# Patient Record
Sex: MALE | Employment: FULL TIME | URBAN - METROPOLITAN AREA
[De-identification: names, ages, dates, MRNs, and addresses within clinical notes are randomized per-mention and may not be internally consistent; named-entity substitution may affect disease eponyms.]

---

## 2017-09-11 ENCOUNTER — OFFICE VISIT (OUTPATIENT)
Dept: FAMILY MEDICINE | Facility: CLINIC | Age: 47
End: 2017-09-11
Payer: COMMERCIAL

## 2017-09-11 VITALS
HEIGHT: 72 IN | TEMPERATURE: 98.5 F | BODY MASS INDEX: 28.71 KG/M2 | DIASTOLIC BLOOD PRESSURE: 64 MMHG | SYSTOLIC BLOOD PRESSURE: 116 MMHG | OXYGEN SATURATION: 100 % | WEIGHT: 212 LBS | HEART RATE: 68 BPM

## 2017-09-11 DIAGNOSIS — J02.9 VIRAL PHARYNGITIS: Primary | ICD-10-CM

## 2017-09-11 LAB
DEPRECATED S PYO AG THROAT QL EIA: NORMAL
SPECIMEN SOURCE: NORMAL

## 2017-09-11 PROCEDURE — 99213 OFFICE O/P EST LOW 20 MIN: CPT | Performed by: FAMILY MEDICINE

## 2017-09-11 PROCEDURE — 87081 CULTURE SCREEN ONLY: CPT | Performed by: FAMILY MEDICINE

## 2017-09-11 PROCEDURE — 87880 STREP A ASSAY W/OPTIC: CPT | Performed by: FAMILY MEDICINE

## 2017-09-11 NOTE — PROGRESS NOTES
"  SUBJECTIVE:   Jeovany Wilburn is a 47 year old male who presents to clinic today for the following health issues:      Acute Illness   Acute illness concerns: sore throat  Onset: 2 weeks    Fever: no    Chills/Sweats: no    Headache (location?): YES    Sinus Pressure:YES    Conjunctivitis:  no    Ear Pain: no    Rhinorrhea: YES    Congestion: YES    Sore Throat: YES     Cough: YES-non-productive    Wheeze: YES    Decreased Appetite: YES- Less PO    Nausea: no    Vomiting: no    Diarrhea:  no    Dysuria/Freq.: no    Fatigue/Achiness: YES- fatigue    Sick/Strep Exposure: no     Therapies Tried and outcome: None    - Patient has heaviness in his chest. Started as restriction in throat that moved down. He has no energy for the past 2 weeks. Patient normally works out often, has not been able to for the past 2 weeks. He thinks he is getting better now.       ROS:  Constitutional, HEENT, cardiovascular, pulmonary, gi and gu systems are negative, except as otherwise noted.    This document serves as a record of the services and decisions personally performed and made by Danyell Shah MD. It was created on his behalf by Chava Bro, a trained medical scribe. The creation of this document is based the provider's statements to the medical scribe.  Chava Bro 2:47 PM September 11, 2017  OBJECTIVE:   /64  Pulse 68  Temp 98.5  F (36.9  C) (Tympanic)  Ht 5' 11.5\" (1.816 m)  Wt 212 lb (96.2 kg)  SpO2 100%  BMI 29.16 kg/m2  Body mass index is 29.16 kg/(m^2).       GENERAL: healthy, alert and no distress  EYES: Eyes grossly normal to inspection, conjunctivae and sclerae normal  HENT: ear canals and TM's normal, nose and mouth without ulcers or lesions  RESP: lungs clear to auscultation - no rales, rhonchi or wheezes  CV: regular rate and rhythm, normal S1 S2, no murmur  ABDOMEN: soft, nontender, no hepatosplenomegaly, no masses and bowel sounds normal  MS: no gross musculoskeletal defects noted, no " edema        ASSESSMENT/PLAN:     (J02.9) Viral pharyngitis  (primary encounter diagnosis)  Comment: Rapid was negative. Patient should continue to improve with time.   Plan: Strep, Rapid Screen            Patient will follow up if symptoms worsen or do not improve. Patient instructed to call with any questions or concerns.      Patient Instructions   *   No signs of strep throat.     *   Sounds like a viral bronchitis.     *   Since you've         The information in this document, created by a scribe for me, accurately reflects the services I personally performed and the decisions made by me. I have reviewed and approved this document for accuracy. 2:48 PM 9/11/2017      Danyell Shah MD  [facility]

## 2017-09-11 NOTE — NURSING NOTE
"Chief Complaint   Patient presents with     URI       Initial /64  Pulse 68  Temp 98.5  F (36.9  C) (Tympanic)  Ht 5' 11.5\" (1.816 m)  Wt 212 lb (96.2 kg)  SpO2 100%  BMI 29.16 kg/m2 Estimated body mass index is 29.16 kg/(m^2) as calculated from the following:    Height as of this encounter: 5' 11.5\" (1.816 m).    Weight as of this encounter: 212 lb (96.2 kg).  Medication Reconciliation: complete  "

## 2017-09-11 NOTE — MR AVS SNAPSHOT
"              After Visit Summary   9/11/2017    Jeovany Wilburn    MRN: [id number]           Patient Information     Date Of Birth          1970        Visit Information        Provider Department      9/11/2017 2:20 PM Danyell Shah MD [facility]        Today's Diagnoses     Viral pharyngitis    -  1      Care Instructions    *   No signs of strep throat.     *   Sounds like a viral bronchitis.     *   Since you've           Follow-ups after your visit        Who to contact     Normal or non-critical lab and imaging results will be communicated to you by Corpsolvhart, letter or phone within 4 business days after the clinic has received the results. If you do not hear from us within 7 days, please contact the clinic through Motigat or phone. If you have a critical or abnormal lab result, we will notify you by phone as soon as possible.  Submit refill requests through Zaggora or call your pharmacy and they will forward the refill request to us. Please allow 3 business days for your refill to be completed.          If you need to speak with a  for additional information , please call: [phone]           Additional Information About Your Visit        [email or web address] Information     Zaggora gives you secure access to your electronic health record. If you see a primary care provider, you can also send messages to your care team and make appointments. If you have questions, please call your primary care clinic.  If you do not have a primary care provider, please call [phone] and they will assist you.        Care EveryWhere ID     This is your Care EveryWhere ID. This could be used by other organizations to access your [place] medical records  [id number]        Your Vitals Were     Pulse Temperature Height Pulse Oximetry BMI (Body Mass Index)       68 98.5  F (36.9  C) (Tympanic) 5' 11.5\" (1.816 m) 100% 29.16 kg/m2        Blood Pressure from Last 3 Encounters:   09/11/17 116/64 "   12/12/14 115/76   10/24/14 122/79    Weight from Last 3 Encounters:   09/11/17 212 lb (96.2 kg)   12/12/14 223 lb 6.4 oz (101.3 kg)   10/03/14 206 lb 3.2 oz (93.5 kg)              We Performed the Following     Beta strep group A culture     Strep, Rapid Screen        Primary Care Provider Office Phone # Fax #    Danyell Shah [phone] [address] [place] [address]        Equal Access to Services     [facility]: Hadii aad ku hadasho Soomaali, waaxda luqadaha, qaybta kaalmada adeegyada, waxay mashain hayotilio lai . So [facility] [phone].    ATENCIÓN: Si habla español, tiene a fernandez disposición servicios gratuitos de asistencia lingüística. [facility] [phone].    We comply with applicable federal civil rights laws and [place] laws. We do not discriminate on the basis of race, color, national origin, age, disability sex, sexual orientation or gender identity.            Thank you!     Thank you for choosing [facility]  for your care. Our goal is always to provide you with excellent care. Hearing back from our patients is one way we can continue to improve our services. Please take a few minutes to complete the written survey that you may receive in the mail after your visit with us. Thank you!             Your Updated Medication List - Protect others around you: Learn how to safely use, store and throw away your medicines at www.disposemymeds.org.          This list is accurate as of: 9/11/17 11:59 PM.  Always use your most recent med list.                   Brand Name Dispense Instructions for use Diagnosis    fluticasone 50 MCG/ACT spray    FLONASE    1 Package    Spray 1-2 sprays into both nostrils daily    Chronic rhinitis

## 2017-09-12 LAB
BACTERIA SPEC CULT: NORMAL
SPECIMEN SOURCE: NORMAL

## 2017-10-19 ENCOUNTER — OFFICE VISIT (OUTPATIENT)
Dept: FAMILY MEDICINE | Facility: CLINIC | Age: 47
End: 2017-10-19
Payer: COMMERCIAL

## 2017-10-19 VITALS
DIASTOLIC BLOOD PRESSURE: 72 MMHG | HEART RATE: 63 BPM | SYSTOLIC BLOOD PRESSURE: 114 MMHG | WEIGHT: 224.8 LBS | BODY MASS INDEX: 30.92 KG/M2 | OXYGEN SATURATION: 98 % | TEMPERATURE: 97.6 F

## 2017-10-19 DIAGNOSIS — J01.01 ACUTE RECURRENT MAXILLARY SINUSITIS: Primary | ICD-10-CM

## 2017-10-19 PROCEDURE — 99213 OFFICE O/P EST LOW 20 MIN: CPT | Performed by: NURSE PRACTITIONER

## 2017-10-19 RX ORDER — AMOXICILLIN 500 MG/1
1000 CAPSULE ORAL 2 TIMES DAILY
Qty: 40 CAPSULE | Refills: 0 | Status: SHIPPED | OUTPATIENT
Start: 2017-10-19 | End: 2017-10-29

## 2017-10-19 NOTE — MR AVS SNAPSHOT
After Visit Summary   10/19/2017    Jeovany Wilburn    MRN: [id number]           Patient Information     Date Of Birth          1970        Visit Information        Provider Department      10/19/2017 7:20 AM Rivka Pascual APRN [facility]        Today's Diagnoses     Acute recurrent maxillary sinusitis    -  1      Care Instructions    Start the Amoxicillin today   Today and tomorrow you can take the medication 3 times then on Saturday drop to the twice per day dosing .     Airborne is good to take with traveling     Stay well hydrated - urine should be clear.      Get airplane ear plugs to wear when flying to prevent the ear pain             Follow-ups after your visit        Who to contact     Normal or non-critical lab and imaging results will be communicated to you by NoWaithart, letter or phone within 4 business days after the clinic has received the results. If you do not hear from us within 7 days, please contact the clinic through NoWaithart or phone. If you have a critical or abnormal lab result, we will notify you by phone as soon as possible.  Submit refill requests through Intale or call your pharmacy and they will forward the refill request to us. Please allow 3 business days for your refill to be completed.          If you need to speak with a  for additional information , please call: [phone]           Additional Information About Your Visit        Intale Information     Intale gives you secure access to your electronic health record. If you see a primary care provider, you can also send messages to your care team and make appointments. If you have questions, please call your primary care clinic.  If you do not have a primary care provider, please call [phone] and they will assist you.        Care EveryWhere ID     This is your Care EveryWhere ID. This could be used by other organizations to access your [facility]  records  [id number]        Your Vitals Were     Pulse Temperature Pulse Oximetry BMI (Body Mass Index)          63 97.6  F (36.4  C) (Tympanic) 98% 30.92 kg/m2         Blood Pressure from Last 3 Encounters:   10/19/17 114/72   09/11/17 116/64   12/12/14 115/76    Weight from Last 3 Encounters:   10/19/17 224 lb 12.8 oz (102 kg)   09/11/17 212 lb (96.2 kg)   12/12/14 223 lb 6.4 oz (101.3 kg)              Today, you had the following     No orders found for display         Today's Medication Changes          These changes are accurate as of: 10/19/17  7:43 AM.  If you have any questions, ask your nurse or doctor.               Start taking these medicines.        Dose/Directions    amoxicillin 500 MG capsule   Commonly known as:  AMOXIL   Used for:  Acute recurrent maxillary sinusitis   Started by:  Rivka Pascual, ESTEPHANIA CNP        Dose:  1000 mg   Take 2 capsules (1,000 mg) by mouth 2 times daily for 10 days   Quantity:  40 capsule   Refills:  0            Where to get your medicines      These medications [facility] [address] [place] [address] [facility]  [address] [address]     Phone:  [phone]     amoxicillin 500 MG capsule                Primary Care Provider Office Phone # Fax #    Danyell Shah [phone] [address]   [place] [address]        Equal Access to Services     [facility]: Hadii xiomy darbyo Soomaali, waaxda luqadaha, qaybta kaalmada adeegyada, waxjosh sammie wright adecarmen alexandre. So [facility] [place] [phone].    ATENCIÓN: Si habla español, tiene a fernandez disposición servicios gratuitos de asistencia lingüística. Llame al [phone].    We comply with applicable federal civil rights laws and [place] laws. We do not discriminate on the basis of race, color, national origin, age, disability, sex, sexual orientation, or gender identity.            Thank you!     Thank you for choosing [facility]  for  your care. Our goal is always to provide you with excellent care. Hearing back from our patients is one way we can continue to improve our services. Please take a few minutes to complete the written survey that you may receive in the mail after your visit with us. Thank you!             Your Updated Medication List - Protect others around you: Learn how to safely use, store and throw away your medicines at www.disposemymeds.org.          This list is accurate as of: 10/19/17  7:43 AM.  Always use your most recent med list.                   Brand Name Dispense Instructions for use Diagnosis    amoxicillin 500 MG capsule    AMOXIL    40 capsule    Take 2 capsules (1,000 mg) by mouth 2 times daily for 10 days    Acute recurrent maxillary sinusitis       fluticasone 50 MCG/ACT spray    FLONASE    1 Package    Spray 1-2 sprays into both nostrils daily    Chronic rhinitis

## 2017-10-19 NOTE — NURSING NOTE
"Chief Complaint   Patient presents with     Pharyngitis     Cough     Fatigue       Initial /72 (BP Location: Left arm, Patient Position: Chair, Cuff Size: Adult Large)  Pulse 63  Temp 97.6  F (36.4  C) (Tympanic)  Wt 224 lb 12.8 oz (102 kg)  SpO2 98%  BMI 30.92 kg/m2 Estimated body mass index is 30.92 kg/(m^2) as calculated from the following:    Height as of 9/11/17: 5' 11.5\" (1.816 m).    Weight as of this encounter: 224 lb 12.8 oz (102 kg).  Medication Reconciliation: complete     Tanisha Perdomo CMA (AAMA)      "

## 2017-10-19 NOTE — PROGRESS NOTES
"  SUBJECTIVE:   Jeovany Wilburn is a 47 year old male who presents to clinic today for the following health issues:    *HM - Flu shot not offered due to symptoms. Lipids deferred to visit with PCP.    ENT Symptoms             Symptoms: cc Present Absent Comment   Fever/Chills   x    Fatigue x x  More tired than usual   Muscle Aches   x    Eye Irritation  x  Burning    Sneezing   x    Nasal Lester/Drg  x  Congestion   Sinus Pressure/Pain  x  Sinus headaches   Loss of smell   x    Dental pain  x     Sore Throat x x  \"at the back of the tongue\", constant PND   Swollen Glands   x    Ear Pain/Fullness  x  Bilateral plugged feeling   Cough x x  Dry cough   Wheeze   x    Chest Pain   x    Shortness of breath   x    Rash   x    Other  x  Migraines     Symptom duration:  4 weeks   Symptom severity:  Not getting better, moderate   Treatments tried:  Flonase   Contacts:  2 people at work have had walking pneumonia and he had been traveling together for a week           Was seen 2 weeks ago with similar symptoms , is getting worse.    Exposed to pneumonia     Problem list and histories reviewed & adjusted, as indicated.  Additional history: as documented    Patient Active Problem List   Diagnosis     Atopic rhinitis     ETD (eustachian tube dysfunction)     24 hour contact given to patient      CARDIOVASCULAR SCREENING; LDL GOAL LESS THAN 160     Gout     Past Surgical History:   Procedure Laterality Date     LAMINECT/DISCECTOMY, LUMBAR  1/1/2004    Laminectomy/Discectomy Cervical - fusion       Social History   Substance Use Topics     Smoking status: Never Smoker     Smokeless tobacco: Never Used     Alcohol use Yes      Comment: 3-4 drinks per week     Family History   Problem Relation Age of Onset     Family History Negative Mother      Family History Negative Father          Current Outpatient Prescriptions   Medication Sig Dispense Refill     fluticasone (FLONASE) 50 MCG/ACT nasal spray Spray 1-2 sprays into both nostrils " daily 1 Package 11     No Known Allergies  BP Readings from Last 3 Encounters:   10/19/17 114/72   09/11/17 116/64   12/12/14 115/76    Wt Readings from Last 3 Encounters:   10/19/17 224 lb 12.8 oz (102 kg)   09/11/17 212 lb (96.2 kg)   12/12/14 223 lb 6.4 oz (101.3 kg)                        Reviewed and updated as needed this visit by clinical staff     Reviewed and updated as needed this visit by Provider         ROS:  See notes above for  HPI     OBJECTIVE:     /72 (BP Location: Left arm, Patient Position: Chair, Cuff Size: Adult Large)  Pulse 63  Temp 97.6  F (36.4  C) (Tympanic)  Wt 224 lb 12.8 oz (102 kg)  SpO2 98%  BMI 30.92 kg/m2  Body mass index is 30.92 kg/(m^2).   GENERAL: healthy, alert and no distress  HENT: normal cephalic/atraumatic, right ear: clear effusion, left ear: clear effusion, nose and mouth without ulcers or lesions, nasal mucosa edematous, oropharynx clear, oral mucous membranes moist and positive for tonsillar hypertrophy. Maxillary sinuses slightly tender bilaterally.  NECK: Positive for bilateral anterior cervical adenopathy  RESP: lungs clear to auscultation - no rales, rhonchi or wheezes  CV: regular rate and rhythm, normal S1 S2, no S3 or S4, no murmur, click or rub, no peripheral edema and peripheral pulses strong    Diagnostic Test Results:  none     ASSESSMENT/PLAN:     ASSESSMENT/PLAN:      ICD-10-CM    1. Acute recurrent maxillary sinusitis J01.01 amoxicillin (AMOXIL) 500 MG capsule       Patient Instructions   Start the Amoxicillin today   Today and tomorrow you can take the medication 3 times then on Saturday drop to the twice per day dosing .     Airborne is good to take with traveling     Stay well hydrated - urine should be clear.      Get airplane ear plugs to wear when flying to prevent the ear pain                    MEDICATIONS:        - Start taking Amoxicillin        - Continue other medications without change  See Patient Instructions    VINCENT RAGLAND NP,  [facility] [facility]

## 2017-10-19 NOTE — PATIENT INSTRUCTIONS
Start the Amoxicillin today   Today and tomorrow you can take the medication 3 times then on Saturday drop to the twice per day dosing .     Airborne is good to take with traveling     Stay well hydrated - urine should be clear.      Get airplane ear plugs to wear when flying to prevent the ear pain

## 2018-04-25 ENCOUNTER — APPOINTMENT (RX ONLY)
Dept: URBAN - METROPOLITAN AREA CLINIC 23 | Facility: CLINIC | Age: 48
Setting detail: DERMATOLOGY
End: 2018-04-25

## 2018-04-25 DIAGNOSIS — I87.2 VENOUS INSUFFICIENCY (CHRONIC) (PERIPHERAL): ICD-10-CM

## 2018-04-25 DIAGNOSIS — D22 MELANOCYTIC NEVI: ICD-10-CM

## 2018-04-25 DIAGNOSIS — L30.9 DERMATITIS, UNSPECIFIED: ICD-10-CM

## 2018-04-25 DIAGNOSIS — L82.1 OTHER SEBORRHEIC KERATOSIS: ICD-10-CM

## 2018-04-25 PROBLEM — I83.12 VARICOSE VEINS OF LEFT LOWER EXTREMITY WITH INFLAMMATION: Status: ACTIVE | Noted: 2018-04-25

## 2018-04-25 PROBLEM — D22.39 MELANOCYTIC NEVI OF OTHER PARTS OF FACE: Status: ACTIVE | Noted: 2018-04-25

## 2018-04-25 PROBLEM — I83.11 VARICOSE VEINS OF RIGHT LOWER EXTREMITY WITH INFLAMMATION: Status: ACTIVE | Noted: 2018-04-25

## 2018-04-25 PROCEDURE — ? OTHER

## 2018-04-25 PROCEDURE — ? OBSERVATION

## 2018-04-25 PROCEDURE — ? BODY PHOTOGRAPHY

## 2018-04-25 PROCEDURE — ? PRESCRIPTION

## 2018-04-25 PROCEDURE — ? BIOPSY BY SHAVE METHOD

## 2018-04-25 PROCEDURE — 11100: CPT

## 2018-04-25 PROCEDURE — ? RECOMMENDATIONS

## 2018-04-25 PROCEDURE — ? COUNSELING

## 2018-04-25 PROCEDURE — 99213 OFFICE O/P EST LOW 20 MIN: CPT | Mod: 25

## 2018-04-25 RX ORDER — TRIAMCINOLONE ACETONIDE 1 MG/G
CREAM TOPICAL
Qty: 1 | Refills: 3 | Status: ERX | COMMUNITY
Start: 2018-04-25

## 2018-04-25 RX ORDER — CLOBETASOL PROPIONATE 0.5 MG/G
OINTMENT TOPICAL
Qty: 1 | Refills: 2 | Status: ERX | COMMUNITY
Start: 2018-04-25

## 2018-04-25 RX ADMIN — CLOBETASOL PROPIONATE: 0.5 OINTMENT TOPICAL at 14:48

## 2018-04-25 RX ADMIN — TRIAMCINOLONE ACETONIDE: 1 CREAM TOPICAL at 14:45

## 2018-04-25 ASSESSMENT — LOCATION SIMPLE DESCRIPTION DERM
LOCATION SIMPLE: NOSE
LOCATION SIMPLE: RIGHT PRETIBIAL REGION
LOCATION SIMPLE: LEFT FOREARM
LOCATION SIMPLE: ABDOMEN
LOCATION SIMPLE: LEFT TEMPLE
LOCATION SIMPLE: RIGHT LOWER BACK
LOCATION SIMPLE: RIGHT UPPER ARM
LOCATION SIMPLE: CHEST
LOCATION SIMPLE: RIGHT THIGH
LOCATION SIMPLE: LEFT BUTTOCK
LOCATION SIMPLE: RIGHT BUTTOCK
LOCATION SIMPLE: LEFT UPPER ARM
LOCATION SIMPLE: RIGHT FOREARM
LOCATION SIMPLE: LEFT NOSE
LOCATION SIMPLE: LEFT PRETIBIAL REGION

## 2018-04-25 ASSESSMENT — LOCATION DETAILED DESCRIPTION DERM
LOCATION DETAILED: LEFT PROXIMAL POSTERIOR UPPER ARM
LOCATION DETAILED: RIGHT PROXIMAL PRETIBIAL REGION
LOCATION DETAILED: XIPHOID
LOCATION DETAILED: LEFT MEDIAL INFERIOR CHEST
LOCATION DETAILED: LEFT PROXIMAL DORSAL FOREARM
LOCATION DETAILED: LEFT NASAL DORSUM
LOCATION DETAILED: RIGHT ANTERIOR PROXIMAL THIGH
LOCATION DETAILED: LEFT LATERAL TEMPLE
LOCATION DETAILED: LEFT DISTAL PRETIBIAL REGION
LOCATION DETAILED: RIGHT DISTAL PRETIBIAL REGION
LOCATION DETAILED: LEFT BUTTOCK
LOCATION DETAILED: LEFT NASAL ALA
LOCATION DETAILED: RIGHT INFERIOR MEDIAL MIDBACK
LOCATION DETAILED: RIGHT BUTTOCK
LOCATION DETAILED: RIGHT PROXIMAL POSTERIOR UPPER ARM
LOCATION DETAILED: RIGHT PROXIMAL RADIAL DORSAL FOREARM
LOCATION DETAILED: STERNUM
LOCATION DETAILED: EPIGASTRIC SKIN
LOCATION DETAILED: LEFT PROXIMAL PRETIBIAL REGION

## 2018-04-25 ASSESSMENT — LOCATION ZONE DERM
LOCATION ZONE: TRUNK
LOCATION ZONE: ARM
LOCATION ZONE: NOSE
LOCATION ZONE: FACE
LOCATION ZONE: LEG

## 2018-04-25 NOTE — PROCEDURE: BODY PHOTOGRAPHY
Number Of Photographs (Optional- Will Not Render If 0): 1
Was The Entire Body Photographed (Cannot Bill Unless Entire Body Photographed)?: No
Consent: Written consent obtained, risks reviewed for whole body photography. Patient understands that photograph costs may not be covered by insurance, and patient is ultimately responsible for payment.
Whole Body Statement: The whole body was photographed today.
Detail Level: Detailed

## 2018-04-25 NOTE — PROCEDURE: OTHER
Note Text (......Xxx Chief Complaint.): This diagnosis correlates with the
Detail Level: Simple
Other (Free Text): Been present whole life per pt.

## 2018-04-25 NOTE — HPI: DISCOLORATION
How Severe Is Your Skin Discoloration?: moderate
Additional History: The patient states that the area retains fluid and is tender if touched.

## 2018-04-25 NOTE — PROCEDURE: BIOPSY BY SHAVE METHOD
Biopsy Type: H and E
Billing Type: Third-Party Bill
Accession #: PC
Dressing: bandage
Biopsy Method: double edge Personna blade
Bill 53151 For Specimen Handling/Conveyance To Laboratory?: no
Wound Care: Vaseline
Anesthesia Volume In Cc: 0.5
Consent: Written consent was obtained and risks were reviewed including but not limited to scarring, infection, bleeding, scabbing, incomplete removal, and allergy to anesthesia.
Detail Level: Detailed
Post-care instructions were reviewed in detail and written instructions are provided. Patient is to keep the biopsy site dry overnight, and then apply bacitracin twice daily until healed. Patient may apply hydrogen peroxide soaks to remove any crusting.
Was A Bandage Applied: Yes
Electrodesiccation Text: The wound bed was treated with electrodesiccation after the biopsy was performed.
X Size Of Lesion In Cm: 0
Silver Nitrate Text: The wound bed was treated with silver nitrate after the biopsy was performed.
Anesthesia Type: 1% lidocaine without epinephrine and a 1:10 solution of 8.4% sodium bicarbonate
Electrodesiccation And Curettage Text: The wound bed was treated with electrodesiccation and curettage after the biopsy was performed.
Cryotherapy Text: The wound bed was treated with cryotherapy after the biopsy was performed.
Notification Instructions: Patient will be notified of biopsy results. However, patient instructed to call the office if not contacted within 2 weeks.
Type Of Destruction Used: Curettage
Hemostasis: Aluminum Chloride

## 2018-04-25 NOTE — PROCEDURE: RECOMMENDATIONS
Detail Level: Zone
Recommendations (Free Text): Wear compression stockings. Elevate legs. He is a . PCP ruled out a clot recently with US

## 2018-10-26 ENCOUNTER — OFFICE VISIT (OUTPATIENT)
Dept: FAMILY MEDICINE | Facility: CLINIC | Age: 48
End: 2018-10-26
Payer: COMMERCIAL

## 2018-10-26 VITALS
HEIGHT: 71 IN | DIASTOLIC BLOOD PRESSURE: 68 MMHG | RESPIRATION RATE: 20 BRPM | TEMPERATURE: 97.1 F | BODY MASS INDEX: 28.92 KG/M2 | SYSTOLIC BLOOD PRESSURE: 100 MMHG | WEIGHT: 206.6 LBS | HEART RATE: 72 BPM

## 2018-10-26 DIAGNOSIS — M54.2 CERVICAL PAIN (NECK): ICD-10-CM

## 2018-10-26 DIAGNOSIS — M54.10 RADICULOPATHY AFFECTING UPPER EXTREMITY: ICD-10-CM

## 2018-10-26 DIAGNOSIS — Z12.11 ENCOUNTER FOR FIT (FECAL IMMUNOCHEMICAL TEST) SCREENING: Primary | ICD-10-CM

## 2018-10-26 DIAGNOSIS — Z13.6 SCREENING FOR CARDIOVASCULAR CONDITION: ICD-10-CM

## 2018-10-26 DIAGNOSIS — Z23 NEED FOR PROPHYLACTIC VACCINATION AND INOCULATION AGAINST INFLUENZA: ICD-10-CM

## 2018-10-26 DIAGNOSIS — Z13.1 SCREENING FOR DIABETES MELLITUS: ICD-10-CM

## 2018-10-26 PROCEDURE — 90471 IMMUNIZATION ADMIN: CPT | Performed by: NURSE PRACTITIONER

## 2018-10-26 PROCEDURE — 99214 OFFICE O/P EST MOD 30 MIN: CPT | Mod: 25 | Performed by: NURSE PRACTITIONER

## 2018-10-26 PROCEDURE — 90682 RIV4 VACC RECOMBINANT DNA IM: CPT | Performed by: NURSE PRACTITIONER

## 2018-10-26 RX ORDER — DIAZEPAM 5 MG
TABLET ORAL
Qty: 2 TABLET | Refills: 0 | Status: SHIPPED | OUTPATIENT
Start: 2018-10-26 | End: 2018-11-07

## 2018-10-26 RX ORDER — HYDROCODONE BITARTRATE AND ACETAMINOPHEN 5; 325 MG/1; MG/1
1 TABLET ORAL EVERY 4 HOURS PRN
Qty: 20 TABLET | Refills: 0 | Status: SHIPPED | OUTPATIENT
Start: 2018-10-26 | End: 2018-11-07

## 2018-10-26 ASSESSMENT — PAIN SCALES - GENERAL: PAINLEVEL: MODERATE PAIN (4)

## 2018-10-26 ASSESSMENT — ENCOUNTER SYMPTOMS
SORE THROAT: 0
FATIGUE: 0
CONSTIPATION: 0
COUGH: 0
DYSURIA: 0
NECK PAIN: 1
HEADACHES: 0
NUMBNESS: 1
ARTHRALGIAS: 0
ABDOMINAL PAIN: 0
DYSPHORIC MOOD: 0
JOINT SWELLING: 0
SHORTNESS OF BREATH: 0
WHEEZING: 0
SINUS PRESSURE: 0
RHINORRHEA: 0
PALPITATIONS: 0
FREQUENCY: 0
SLEEP DISTURBANCE: 0
NERVOUS/ANXIOUS: 0
NECK STIFFNESS: 1
BACK PAIN: 0
DIARRHEA: 0
LIGHT-HEADEDNESS: 0
DIZZINESS: 0

## 2018-10-26 NOTE — PROGRESS NOTES
SUBJECTIVE:   Jeovany Wilburn is a 48 year old male who presents to clinic today for the following health issues:      Back Pain       Duration: 15 years of back pain, over past 3 weeks pain has been worsening        Specific cause: no known injury but woke up in hotel with stiff neck after sleeping on bad pillow    Description:   Location of pain: Numbness in left arm all the time, right arm numbness that comes and goes. Pain can be in neck and down to between shoulders. Pain doesn't go below shoulder level.   Character of pain: sharp pains in neck, sore muscle pain if pain is lower than neck  Pain radiation:tightness in upper left arm  New numbness or weakness in legs, not attributed to pain:  no     Intensity: Currently 4/10    History:   Pain interferes with job: YES, pain interferes with everything he does  History of back problems: YES-C5-C6 fusion 15 years ago, lower back surgery 4 years ago  Any previous MRI or X-rays: Yes--15 years ago had MRI (unsure of location)  Sees a specialist for back pain:  No  Therapies tried without relief: none    Alleviating factors:   Improved by: ibuprofen 600 mg three times per day, heat, massage and stretching helps temporarily    Precipitating factors:  Worsened by: turning head, lifting        Accompanying Signs & Symptoms:  Risk of Fracture:  None  Risk of Cauda Equina:  None  Risk of Infection:  None  Risk of Cancer:  None  Risk of Ankylosing Spondylitis:  Onset at age <35, male, AND morning back stiffness. no     Problem list and histories reviewed & adjusted, as indicated.  Additional history: as documented    Current Outpatient Prescriptions   Medication Sig Dispense Refill     diazepam (VALIUM) 5 MG tablet Take 30-60 minutes before procedure. May repeat in 30 minutes if needed. Do not operate a vehicle after taking this medication. 2 tablet 0     fluticasone (FLONASE) 50 MCG/ACT nasal spray Spray 1-2 sprays into both nostrils daily 1 Package 11      HYDROcodone-acetaminophen (NORCO) 5-325 MG per tablet Take 1 tablet by mouth every 4 hours as needed for pain 20 tablet 0     IBUPROFEN PO Take 600 mg by mouth       No Known Allergies    Reviewed and updated as needed this visit by clinical staff  Tobacco  Allergies  Meds  Problems  Med Hx  Surg Hx  Fam Hx  Soc Hx        Reviewed and updated as needed this visit by Provider  Problems          15 years ago with ruptured disk in neck. At that time had been having some stiffness in neck. Feels like at that time, did not keep up with PHYSICAL THERAPY. Has had lower back pain for long time. Then about 4 years ago pain really started to get bad to lower back and was having pain down leg. Had 1 vertebrae that was slipping on top of the other one. At that time thought that lower back acutally cuased the problem with neck. Aterior and posterior fusion to lower back about 4 years ago. Works out, lifts weights. Occasionally will have stiff neck. 3 weeks ago in hotel had stiff neck. Returned home and started normal routine and neck kept getting worse. Started with numbness to left arm. Does occasionally get numbness to right arm but that comes and goes. Left arm has different levels of numbness. Has been taking 600 mg of advil three times per day. Lower back is still good, no pain there.     Has never had a colonoscopy.  Is agreeable to getting one once neck is fixed.      ROS:  Review of Systems   Constitutional: Negative for fatigue.   HENT: Negative for congestion, ear pain, rhinorrhea, sinus pressure and sore throat.    Respiratory: Negative for cough, shortness of breath and wheezing.    Cardiovascular: Negative for chest pain, palpitations and leg swelling.   Gastrointestinal: Negative for abdominal pain, constipation and diarrhea.   Genitourinary: Negative for dysuria and frequency.   Musculoskeletal: Positive for neck pain and neck stiffness. Negative for arthralgias, back pain and joint swelling.   Skin:  "Negative for rash.   Neurological: Positive for numbness (right arm all the time, left arm comes and goes). Negative for dizziness, light-headedness and headaches.   Psychiatric/Behavioral: Negative for dysphoric mood and sleep disturbance. The patient is not nervous/anxious.          OBJECTIVE:     /68 (BP Location: Left arm, Patient Position: Sitting, Cuff Size: Adult Large)  Pulse 72  Temp 97.1  F (36.2  C) (Tympanic)  Resp 20  Ht 5' 10.75\" (1.797 m)  Wt 206 lb 9.6 oz (93.7 kg)  BMI 29.02 kg/m2  Body mass index is 29.02 kg/(m^2).  Physical Exam   Constitutional: He appears well-developed and well-nourished.   Cardiovascular: Normal rate, regular rhythm and normal heart sounds.    Pulmonary/Chest: Effort normal and breath sounds normal.   Musculoskeletal:        Cervical back: He exhibits decreased range of motion, tenderness, bony tenderness and pain. He exhibits no swelling, no edema and no spasm.   Neurological: He is alert.   Skin: Skin is warm and dry.       ASSESSMENT/PLAN:   1. Encounter for FIT (fecal immunochemical test) screening  Order placed, plans to call per self and set up  - Fecal colorectal cancer screen FIT; Future    2. Cervical pain (neck)  We will plan to set up for MRI and refer to neurosurgery.  Educated on use of diazepam.  Aware that will need a  for MRI.  Encouraged to refrain from heavy lifting, working out.  Continue ibuprofen.  Educated regarding warning signs to watch for and when would need to seek immediate medical attention.  Initially, declines need for pain medication.  Will give written prescription to have on hand over the weekend or for use in the future if pain increases.  Educated on use.  - MR Cervical Spine w/o Contrast; Future  - NEUROSURGERY REFERRAL  - diazepam (VALIUM) 5 MG tablet; Take 30-60 minutes before procedure. May repeat in 30 minutes if needed. Do not operate a vehicle after taking this medication.  Dispense: 2 tablet; Refill: 0  - " HYDROcodone-acetaminophen (NORCO) 5-325 MG per tablet; Take 1 tablet by mouth every 4 hours as needed for pain  Dispense: 20 tablet; Refill: 0    3. Need for prophylactic vaccination and inoculation against influenza  Administer in clinic today   - FLU VACCINE, (RIV4) RECOMBINANT HA  , IM (FluBlok, egg free) [14705]- >18 YRS ([facility] recommended  50-64 YRS)  - Vaccine Administration, Initial [16610]    4. Radiculopathy affecting upper extremity  We will plan to set up for MRI and refer to neurosurgery.  Educated on use of diazepam.  Aware that will need a  for MRI.  Encouraged to refrain from heavy lifting, working out.  Continue ibuprofen.  Educated regarding warning signs to watch for and when would need to seek immediate medical attention.  Initially, declines need for pain medication.  Will give written prescription to have on hand over the weekend or for use in the future if pain increases.  Educated on use.    5. Screening for diabetes mellitus  Return for fasting labs   - Basic metabolic panel; Future    6. Screening for cardiovascular condition  Return for fasting labs   - Lipid panel reflex to direct LDL Fasting; Future    ESTEPHANIA Thorne [facility]

## 2018-10-26 NOTE — MR AVS SNAPSHOT
After Visit Summary   10/26/2018    Jeovany Wilburn    MRN: [id number]           Patient Information     Date Of Birth          1970        Visit Information        Provider Department      10/26/2018 9:00 AM Karey Munoz APRN CNP [facility]        Today's Diagnoses     Encounter for FIT (fecal immunochemical test) screening    -  1    Cervical pain (neck)           Follow-ups after your visit        Additional Services     NEUROSURGERY REFERRAL       Your provider has referred you to: [facility]: [facility] [phone]   [email or web address]  [facility]: [facility] [place] [facility] [place] [phone]   [email or web address]  [facility]: [facility] - [place] [phone]   [email or web address]    Please be aware that coverage of these services is subject to the terms and limitations of your health insurance plan.  Call member services at your health plan with any benefit or coverage questions.      Please bring the following with you to your appointment:    (1) Any X-Rays, CTs or MRIs which have been performed.  Contact the facility where they were done to arrange for  prior to your scheduled appointment.   (2) List of current medications  (3) This referral request   (4) Any documents/labs given to you for this referral                  Follow-up notes from your care team     Return for A Fasting Lab Only Visit.      Your next 10 appointments already scheduled     Oct 31, 2018  9:00 AM CDT   LAB with [facility]   [facility] ([facility])    [address] [address] [phone]           Please do not eat 10-12 hours before your appointment if you are coming in fasting for labs on lipids, cholesterol, or glucose (sugar). This does not apply to pregnant women. Water, hot tea and black coffee  "(with nothing added) are okay. Do not drink other fluids, diet soda or chew gum.              Future tests that were ordered for you today     Open Future Orders        Priority Expected Expires Ordered    Fecal colorectal cancer screen FIT Routine 11/16/2018 1/18/2019 10/26/2018    MR Cervical Spine w/o Contrast Routine  10/26/2019 10/26/2018            Who to contact     Normal or non-critical lab and imaging results will be communicated to you by Liter[employer], letter or phone within 4 business days after the clinic has received the results. If you do not hear from us within 7 days, please contact the clinic through [employer] or phone. If you have a critical or abnormal lab result, we will notify you by phone as soon as possible.  Submit refill requests through [employer] or call your pharmacy and they will forward the refill request to us. Please allow 3 business days for your refill to be completed.          If you need to speak with a  for additional information , please call: [phone]           Additional Information About Your Visit        [employer] Information     [employer] gives you secure access to your electronic health record. If you see a primary care provider, you can also send messages to your care team and make appointments. If you have questions, please call your primary care clinic.  If you do not have a primary care provider, please call [phone] and they will assist you.        Care EveryWhere ID     This is your Care EveryWhere ID. This could be used by other organizations to access your [place] medical records  [id number]        Your Vitals Were     Pulse Temperature Respirations Height BMI (Body Mass Index)       72 97.1  F (36.2  C) (Tympanic) 20 5' 10.75\" (1.797 m) 29.02 kg/m2        Blood Pressure from Last 3 Encounters:   10/26/18 100/68   10/19/17 114/72   09/11/17 116/64    Weight from Last 3 Encounters:   10/26/18 206 lb 9.6 oz (93.7 kg)   10/19/17 224 lb 12.8 oz (102 kg) "   09/11/17 212 lb (96.2 kg)              We Performed the Following     NEUROSURGERY REFERRAL          Today's Medication Changes          These changes are accurate as of 10/26/18  9:41 AM.  If you have any questions, ask your nurse or doctor.               Start taking these medicines.        Dose/Directions    diazepam 5 MG tablet   Commonly known as:  VALIUM   Used for:  Cervical pain (neck)   Started by:  Karey Munoz APRN CNP        Take 30-60 minutes before procedure. May repeat in 30 minutes if needed. Do not operate a vehicle after taking this medication.   Quantity:  2 tablet   Refills:  0       HYDROcodone-acetaminophen 5-325 MG per tablet   Commonly known as:  NORCO   Used for:  Cervical pain (neck)   Started by:  Karey Munoz APRN CNP        Dose:  1 tablet   Take 1 tablet by mouth every 4 hours as needed for pain   Quantity:  20 tablet   Refills:  0            Where to get your medicines      Some of these will need a paper prescription and others can be bought over the counter.  Ask your nurse if you have questions.     Bring a paper prescription for each of these medications     diazepam 5 MG tablet    HYDROcodone-acetaminophen 5-325 MG per tablet               Information about OPIOIDS     PRESCRIPTION OPIOIDS: WHAT YOU NEED TO KNOW   We gave you an opioid (narcotic) pain medicine. It is important to manage your pain, but opioids are not always the best choice. You should first try all the other options your care team gave you. Take this medicine for as short a time (and as few doses) as possible.    Some activities can increase your pain, such as bandage changes or therapy sessions. It may help to take your pain medicine 30 to 60 minutes before these activities. Reduce your stress by getting enough sleep, working on hobbies you enjoy and practicing relaxation or meditation. Talk to your care team about ways to manage your pain beyond prescription opioids.    These medicines  have risks:    DO NOT drive when on new or higher doses of pain medicine. These medicines can affect your alertness and reaction times, and you could be arrested for driving under the influence (DUI). If you need to use opioids long-term, talk to your care team about driving.    DO NOT operate heavy machinery    DO NOT do any other dangerous activities while taking these medicines.    DO NOT drink any alcohol while taking these medicines.     If the opioid prescribed includes acetaminophen, DO NOT take with any other medicines that contain acetaminophen. Read all labels carefully. Look for the word  acetaminophen  or  Tylenol.  Ask your pharmacist if you have questions or are unsure.    You can get addicted to pain medicines, especially if you have a history of addiction (chemical, alcohol or substance dependence). Talk to your care team about ways to reduce this risk.    All opioids tend to cause constipation. Drink plenty of water and eat foods that have a lot of fiber, such as fruits, vegetables, prune juice, apple juice and high-fiber cereal. Take a laxative (Miralax, milk of magnesia, Colace, Senna) if you don t move your bowels at least every other day. Other side effects include upset stomach, sleepiness, dizziness, throwing up, tolerance (needing more of the medicine to have the same effect), physical dependence and slowed breathing.    Store your pills in a secure place, locked if possible. We will not replace any lost or stolen medicine. If you don t finish your medicine, please throw away (dispose) as directed by your pharmacist. The [place] [employer]ution [employer] has more information about safe disposal: [email or web address]         Primary Care Provider Office Phone # Fax #    Danyell Shah [phone] [address] [place] [address]        Equal Access to Services     MAKEDA CAMERON AH: Britney Hernandez  dawna young, edgar kashamir thorpe, lisa burnspatricia ah. So [facility] [phone].    ATENCIÓN: Si eneida ayala, tiene a fernandez disposición servicios gratuitos de asistencia lingüística. Neel al [phone].    We comply with applicable federal civil rights laws and [place] laws. We do not discriminate on the basis of race, color, national origin, age, disability, sex, sexual orientation, or gender identity.            Thank you!     Thank you for choosing [facility]  for your care. Our goal is always to provide you with excellent care. Hearing back from our patients is one way we can continue to improve our services. Please take a few minutes to complete the written survey that you may receive in the mail after your visit with us. Thank you!             Your Updated Medication List - Protect others around you: Learn how to safely use, store and throw away your medicines at www.disposemymeds.org.          This list is accurate as of 10/26/18  9:41 AM.  Always use your most recent med list.                   Brand Name Dispense Instructions for use Diagnosis    diazepam 5 MG tablet    VALIUM    2 tablet    Take 30-60 minutes before procedure. May repeat in 30 minutes if needed. Do not operate a vehicle after taking this medication.    Cervical pain (neck)       fluticasone 50 MCG/ACT spray    FLONASE    1 Package    Spray 1-2 sprays into both nostrils daily    Chronic rhinitis       HYDROcodone-acetaminophen 5-325 MG per tablet    NORCO    20 tablet    Take 1 tablet by mouth every 4 hours as needed for pain    Cervical pain (neck)       IBUPROFEN PO      Take 600 mg by mouth

## 2018-10-26 NOTE — PROGRESS NOTES

## 2018-10-29 ENCOUNTER — HOSPITAL ENCOUNTER (OUTPATIENT)
Dept: MRI IMAGING | Facility: CLINIC | Age: 48
Discharge: HOME | End: 2018-10-29
Attending: NURSE PRACTITIONER | Admitting: NURSE PRACTITIONER
Payer: COMMERCIAL

## 2018-10-29 DIAGNOSIS — M54.2 CERVICAL PAIN (NECK): ICD-10-CM

## 2018-10-29 PROCEDURE — 72141 MRI NECK SPINE W/O DYE: CPT

## 2018-11-01 DIAGNOSIS — Z13.1 SCREENING FOR DIABETES MELLITUS: ICD-10-CM

## 2018-11-01 DIAGNOSIS — Z13.6 SCREENING FOR CARDIOVASCULAR CONDITION: ICD-10-CM

## 2018-11-01 LAB
ANION GAP SERPL CALCULATED.3IONS-SCNC: 6 MMOL/L (ref 3–14)
BUN SERPL-MCNC: 13 MG/DL (ref 7–30)
CALCIUM SERPL-MCNC: 8.9 MG/DL (ref 8.5–10.1)
CHLORIDE SERPL-SCNC: 104 MMOL/L (ref 94–109)
CHOLEST SERPL-MCNC: 122 MG/DL
CO2 SERPL-SCNC: 28 MMOL/L (ref 20–32)
CREAT SERPL-MCNC: 0.97 MG/DL (ref 0.66–1.25)
GFR SERPL CREATININE-BSD FRML MDRD: 82 ML/MIN/1.7M2
GLUCOSE SERPL-MCNC: 88 MG/DL (ref 70–99)
HDLC SERPL-MCNC: 39 MG/DL
LDLC SERPL CALC-MCNC: 69 MG/DL
NONHDLC SERPL-MCNC: 83 MG/DL
POTASSIUM SERPL-SCNC: 4.7 MMOL/L (ref 3.4–5.3)
SODIUM SERPL-SCNC: 138 MMOL/L (ref 133–144)
TRIGL SERPL-MCNC: 69 MG/DL

## 2018-11-01 PROCEDURE — 80048 BASIC METABOLIC PNL TOTAL CA: CPT | Performed by: NURSE PRACTITIONER

## 2018-11-01 PROCEDURE — 36415 COLL VENOUS BLD VENIPUNCTURE: CPT | Performed by: NURSE PRACTITIONER

## 2018-11-01 PROCEDURE — 80061 LIPID PANEL: CPT | Performed by: NURSE PRACTITIONER

## 2018-11-07 ENCOUNTER — OFFICE VISIT (OUTPATIENT)
Dept: NEUROSURGERY | Facility: CLINIC | Age: 48
End: 2018-11-07
Attending: NEUROLOGICAL SURGERY
Payer: COMMERCIAL

## 2018-11-07 VITALS
HEART RATE: 71 BPM | SYSTOLIC BLOOD PRESSURE: 121 MMHG | HEIGHT: 70 IN | OXYGEN SATURATION: 100 % | WEIGHT: 206 LBS | DIASTOLIC BLOOD PRESSURE: 74 MMHG | BODY MASS INDEX: 29.49 KG/M2

## 2018-11-07 DIAGNOSIS — M54.12 CERVICAL RADICULAR PAIN: Primary | ICD-10-CM

## 2018-11-07 PROCEDURE — 99204 OFFICE O/P NEW MOD 45 MIN: CPT | Performed by: NURSE PRACTITIONER

## 2018-11-07 PROCEDURE — G0463 HOSPITAL OUTPT CLINIC VISIT: HCPCS

## 2018-11-07 RX ORDER — METHYLPREDNISOLONE 4 MG
TABLET, DOSE PACK ORAL
Qty: 21 TABLET | Refills: 0 | Status: SHIPPED | OUTPATIENT
Start: 2018-11-07 | End: 2019-03-21

## 2018-11-07 ASSESSMENT — PAIN SCALES - GENERAL: PAINLEVEL: MODERATE PAIN (4)

## 2018-11-07 NOTE — NURSING NOTE
"Jeovany Wilburn is a 48 year old male who presents for:  Chief Complaint   Patient presents with     Neurologic Problem     Cervical pain, WN/T bilateral arms, pain down the left arm         Vitals:    Vitals:    11/07/18 1256   BP: 121/74   BP Location: Right arm   Patient Position: Sitting   Cuff Size: Adult Large   Pulse: 71   SpO2: 100%   Weight: 206 lb (93.4 kg)   Height: 5' 10\" (1.778 m)       BMI:  Estimated body mass index is 29.56 kg/(m^2) as calculated from the following:    Height as of this encounter: 5' 10\" (1.778 m).    Weight as of this encounter: 206 lb (93.4 kg).    Pain Score:  Moderate Pain (4)      Do you feel safe in your environment?  Yes      Rachel Davis          "

## 2018-11-07 NOTE — MR AVS SNAPSHOT
After Visit Summary   11/7/2018    Jeovany Wilburn    MRN: [id number]           Patient Information     Date Of Birth          1970        Visit Information        Provider Department      11/7/2018 1:10 PM Denae Jones APRN CNP [facility]        Today's Diagnoses     Cervical radicular pain    -  1      Care Instructions    1.  Medrol dose pack for pain and inflammation.  Take as directed  AFTER you have your EMG test completed.    2.  Please schedule your EMG. This is a nerve conduction study.  Someone will contact you to schedule. I will call you with the results.         3.  Please contact the clinic if pain persists after taking oral steroids at [phone].  Then I will order an injection.               Follow-ups after your visit        Additional Services     NEUROLOGY ADULT REFERRAL       Your provider has referred you for the following: EMG bilateral UE  EMG at [facility]: [facility] [phone]   [email or web address]    Please be aware that coverage of these services is subject to the terms and limitations of your health insurance plan.  Call member services at your health plan with any benefit or coverage questions.      Please bring the following with you to your appointment:    (1) Any X-Rays, CTs or MRIs which have been performed.  Contact the facility where they were done to arrange for  prior to your scheduled appointment.    (2) List of current medications  (3) This referral request   (4) Any documents/labs given to you for this referral                  Your next 10 appointments already scheduled     Nov 27, 2018  4:00 PM CST   (Arrive by 3:45 PM)   New Patient Visit with Wendy Miller PA-C   [facility] ([facility])    [address] [place] [address] [phone]              Who to contact     If you have questions or  "need follow up information about today's clinic visit or your schedule please contact [facility] directly at [phone].  Normal or non-critical lab and imaging results will be communicated to you by MyChart, letter or phone within 4 business days after the clinic has received the results. If you do not hear from us within 7 days, please contact the clinic through [employer] or phone. If you have a critical or abnormal lab result, we will notify you by phone as soon as possible.  Submit refill requests through [employer] or call your pharmacy and they will forward the refill request to us. Please allow 3 business days for your refill to be completed.          Additional Information About Your Visit        [employer] [facility] Information     [employer] gives you secure access to your electronic health record. If you see a primary care provider, you can also send messages to your care team and make appointments. If you have questions, please call your primary care clinic.  If you do not have a primary care provider, please call [phone] and they will assist you.        Care EveryWhere ID     This is your Care EveryWhere ID. This could be used by other organizations to access your [place] medical records  [id number]        Your Vitals Were     Pulse Height Pulse Oximetry BMI (Body Mass Index)          71 5' 10\" (1.778 m) 100% 29.56 kg/m2         Blood Pressure from Last 3 Encounters:   11/07/18 121/74   10/26/18 100/68   10/19/17 114/72    Weight from Last 3 Encounters:   11/07/18 206 lb (93.4 kg)   10/26/18 206 lb 9.6 oz (93.7 kg)   10/19/17 224 lb 12.8 oz (102 kg)              We Performed the Following     NEUROLOGY ADULT REFERRAL          Today's Medication Changes          These changes are accurate as of 11/7/18  1:10 PM.  If you have any questions, ask your nurse or doctor.               Start taking these medicines.        Dose/Directions    methylPREDNISolone 4 MG tablet   Commonly known as:  MEDROL " DOSEPAK   Used for:  Cervical radicular pain   Started by:  Denae Jones APRN CNP        Follow package instructions   Quantity:  21 tablet   Refills:  0         Stop taking these medicines if you haven't already. Please contact your care team if you have questions.     diazepam 5 MG tablet   Commonly known as:  VALIUM   Stopped by:  Denae Jones APRN CNP           HYDROcodone-acetaminophen 5-325 MG per tablet   Commonly known as:  NORCO   Stopped by:  Denae Jones APRN CNP                Where to get your medicines      These medications were sent to [facility] [id number] [facility] [employer] - [facility] [address] [facility]  [address] [address]     Phone:  [phone]     methylPREDNISolone 4 MG tablet                Primary Care Provider Office Phone # Fax #    Danyell Shah [phone] [address]   [facility] [address]        Equal Access to Services     [facility]: Hadii aad marco hadasho Soomaali, waaxda luqadaha, qaybta kaalmada adeegyada, waxay idiin hayotilio lai . So [facility] [phone].    ATENCIÓN: Si habla español, tiene a fernandez disposición servicios gratuitos de asistencia lingüística. Neel al [phone].    We comply with applicable federal civil rights laws and [place] laws. We do not discriminate on the basis of race, color, national origin, age, disability, sex, sexual orientation, or gender identity.            Thank you!     Thank you for choosing [place] [facility]  for your care. Our goal is always to provide you with excellent care. Hearing back from our patients is one way we can continue to improve our services. Please take a few minutes to complete the written survey that you may receive in the mail after your visit with us. Thank you!             Your Updated Medication List - Protect others around you: Learn how to safely use, store and throw away your medicines at www.disposemymeds.org.          This list is  accurate as of 11/7/18  1:10 PM.  Always use your most recent med list.                   Brand Name Dispense Instructions for use Diagnosis    fluticasone 50 MCG/ACT spray    FLONASE    1 Package    Spray 1-2 sprays into both nostrils daily    Chronic rhinitis       IBUPROFEN PO      Take 600 mg by mouth        methylPREDNISolone 4 MG tablet    MEDROL DOSEPAK    21 tablet    Follow package instructions    Cervical radicular pain

## 2018-11-07 NOTE — LETTER
11/7/2018         RE: Jeovany Wilburn  [address] [address]  [facility] [facility] [address]        Dear Colleague,    Thank you for referring your patient, Jeovany Wilburn, to the [facility]. Please see a copy of my visit note below.    Dr. Raza Shah  [facility]  Neurosurgery Clinic Visit        CC: neck and arm pain    Primary care Provider: Danyell Shah      Reason For Visit:   I was asked by Dr. Shah to consult on the patient for cervical radicular pain.      HPI: Jeovany Wilburn is a 48 year old male with cervical radicular pain.  He states that he had a cervical fusion about 15 years ago which really helped his pain. Since then he has had neck stiffness but nothing exercise couldn't help. He notes that about 4-5 weeks ago he woke up stiff and then started to not numbness and pain down his arms.  He states that lifting and ROM make the pain worse.  He did go to a chiropractor which he feels helped. He has not had PT or injections for his pain.  He works at a desk job.     Pain at its worst 10  Pain right now:  4    No past medical history on file.    Past Medical History reviewed with patient during visit.    Past Surgical History:   Procedure Laterality Date     LAMINECT/DISCECTOMY, LUMBAR  1/1/2004    Laminectomy/Discectomy Cervical - fusion     Past Surgical History reviewed with patient during visit.    Current Outpatient Prescriptions   Medication     fluticasone (FLONASE) 50 MCG/ACT nasal spray     IBUPROFEN PO     No current facility-administered medications for this visit.      Facility-Administered Medications Ordered in Other Visits   Medication     bupivacaine HCl 0.25 % preservative free injection SOLN 5 mg     iohexol (OMNIPAQUE) 240 mg/mL injection 3 mL     lidocaine (PF) (XYLOCAINE) 1 % injection 50 mg     methylprednisoLONE acetate (DEPO-MEDROL) injection 120 mg     sodium bicarbonate 8.4 % injection 1 mEq       No Known Allergies    Social History  "    Social History     Marital status:      Spouse name: Micky Wilburn     Number of children: N/A     Years of education: N/A     Occupational History      [employer]     Social History Main Topics     Smoking status: Never Smoker     Smokeless tobacco: Never Used     Alcohol use Yes      Comment: 3-4 drinks per week     Drug use: No     Sexual activity: Not Asked     Other Topics Concern     Parent/Sibling W/ Cabg, Mi Or Angioplasty Before 65f 55m? No     Social History Narrative       Family History   Problem Relation Age of Onset     Family History Negative Mother      Family History Negative Father          Review Of Systems  Skin: negative  Eyes: negative  Ears/Nose/Throat: negative  Respiratory: No shortness of breath, dyspnea on exertion, cough, or hemoptysis  Cardiovascular: negative  Gastrointestinal: negative  Genitourinary: negative  Musculoskeletal: neck pain , previous cervical fusion   Neurologic: numbness or tingling of hands  Psychiatric: negative  Hematologic/Lymphatic/Immunologic: negative  Endocrine: negative     ROS: 10 point ROS neg other than the symptoms noted above in the HPI.        Vital Signs: /74 (BP Location: Right arm, Patient Position: Sitting, Cuff Size: Adult Large)  Pulse 71  Ht 5' 10\" (1.778 m)  Wt 206 lb (93.4 kg)  SpO2 100%  BMI 29.56 kg/m2    Examination:  Constitutional:  Alert, well nourished, NAD.  Memory: recent and remote memory intact   HEENT: Normocephalic, atraumatic.   Pulm:  Without shortness of breath   CV:  No pitting edema of BLE.    Neurological:  Awake  Alert  Oriented x 3  Speech clear  Cranial nerves II - XII intact  PERRL  EOMI  Face symmetric  Tongue midline  Motor exam   Shoulder Abduction:  Right:  5/5   Left:  5/5  Biceps:                      Right:  5/5   Left:  5/5  Triceps:                     Right:  5/5   Left:  5/5  Wrist Extensors:       Right:  5/5   Left:  5/5  Wrist Flexors:           Right:  5/5   Left:  5/5  Intrinsics:       "             Right:  5/5   Left:  5/5   Hip Flexor:                Right: 5/5  Left:  5/5  Hip Adductor:             Right:  5/5  Left:  5/5  Hip Abductor:             Right:  5/5  Left:  5/5  Gastroc Soleus:        Right:  5/5  Left:  5/5  Tib/Ant:                      Right:  5/5  Left:  5/5  EHL:                          Right:  5/5  Left:  5/5   Sensation normal to bilateral upper and lower extremities  Muscle tone to bilateral upper and lower extremities normal   Gait: Able to stand from a seated position. Normal non-antalgic, non-myelopathic gait.  Able to heel/toe walk without loss of balance  Cervical examination reveals good range of motion.  No tenderness to palpation of the cervical spine or paraspinous muscles bilaterally.        Imaging:     IMPRESSION:    1. C6-C7 solid anterior spinal fusion.  2. Mild to moderate multilevel degenerative disc disease and  degenerative facet arthropathy as described above.       Assessment/Plan:   Jeovany Wilburn is a 48 year old male with cervical radicular pain.  He states that he had a cervical fusion about 15 years ago which really helped his pain. Since then he has had neck stiffness but nothing exercise couldn't help. He notes that about 4-5 weeks ago he woke up stiff and then started to not numbness and pain down his arms.  He states that lifting and ROM make the pain worse.  He did go to a chiropractor which he feels helped. He has not had PT or injections for his pain.  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  We reviewed his cervical MRI and he has noted solid fusion.  He also has noted DDD. At this time it was recommended he undergo an EMG of his arms and start a medrol dose pack after the EMG.  If the pain persists then we would recommend an injection. He is open to this.  If pt would like to proceed with PT he can call for an order.        Patient Instructions   1.  Medrol dose pack for pain and inflammation.  Take as directed  AFTER you have  your EMG test completed.    2.  Please schedule your EMG. This is a nerve conduction study.  Someone will contact you to schedule. I will call you with the results.         3.  Please contact the clinic if pain persists after taking oral steroids at [phone].  Then I will order an injection.              Denae Jones CNP  [facility]  [address] [address]    Tel [phone]  Pager [phone]      Again, thank you for allowing me to participate in the care of your patient.        Sincerely,        ESTEPHANIA Poole CNP

## 2018-11-07 NOTE — PATIENT INSTRUCTIONS
1.  Medrol dose pack for pain and inflammation.  Take as directed  AFTER you have your EMG test completed.    2.  Please schedule your EMG. This is a nerve conduction study.  Someone will contact you to schedule. I will call you with the results.         3.  Please contact the clinic if pain persists after taking oral steroids at [phone].  Then I will order an injection.

## 2018-11-07 NOTE — PROGRESS NOTES
Dr. Raza Shah  [facility]  Neurosurgery Clinic Visit        CC: neck and arm pain    Primary care Provider: Danyell Shah      Reason For Visit:   I was asked by Dr. Shah to consult on the patient for cervical radicular pain.      HPI: Jeovany Wilburn is a 48 year old male with cervical radicular pain.  He states that he had a cervical fusion about 15 years ago which really helped his pain. Since then he has had neck stiffness but nothing exercise couldn't help. He notes that about 4-5 weeks ago he woke up stiff and then started to not numbness and pain down his arms.  He states that lifting and ROM make the pain worse.  He did go to a chiropractor which he feels helped. He has not had PT or injections for his pain.  He works at a desk job.     Pain at its worst 10  Pain right now:  4    No past medical history on file.    Past Medical History reviewed with patient during visit.    Past Surgical History:   Procedure Laterality Date     LAMINECT/DISCECTOMY, LUMBAR  1/1/2004    Laminectomy/Discectomy Cervical - fusion     Past Surgical History reviewed with patient during visit.    Current Outpatient Prescriptions   Medication     fluticasone (FLONASE) 50 MCG/ACT nasal spray     IBUPROFEN PO     No current facility-administered medications for this visit.      Facility-Administered Medications Ordered in Other Visits   Medication     bupivacaine HCl 0.25 % preservative free injection SOLN 5 mg     iohexol (OMNIPAQUE) 240 mg/mL injection 3 mL     lidocaine (PF) (XYLOCAINE) 1 % injection 50 mg     methylprednisoLONE acetate (DEPO-MEDROL) injection 120 mg     sodium bicarbonate 8.4 % injection 1 mEq       No Known Allergies    Social History     Social History     Marital status:      Spouse name: Micky Wilburn     Number of children: N/A     Years of education: N/A     Occupational History      [employer]     Social History Main Topics     Smoking status: Never Smoker      "Smokeless tobacco: Never Used     Alcohol use Yes      Comment: 3-4 drinks per week     Drug use: No     Sexual activity: Not Asked     Other Topics Concern     Parent/Sibling W/ Cabg, Mi Or Angioplasty Before 65f 55m? No     Social History Narrative       Family History   Problem Relation Age of Onset     Family History Negative Mother      Family History Negative Father          Review Of Systems  Skin: negative  Eyes: negative  Ears/Nose/Throat: negative  Respiratory: No shortness of breath, dyspnea on exertion, cough, or hemoptysis  Cardiovascular: negative  Gastrointestinal: negative  Genitourinary: negative  Musculoskeletal: neck pain , previous cervical fusion   Neurologic: numbness or tingling of hands  Psychiatric: negative  Hematologic/Lymphatic/Immunologic: negative  Endocrine: negative     ROS: 10 point ROS neg other than the symptoms noted above in the HPI.        Vital Signs: /74 (BP Location: Right arm, Patient Position: Sitting, Cuff Size: Adult Large)  Pulse 71  Ht 5' 10\" (1.778 m)  Wt 206 lb (93.4 kg)  SpO2 100%  BMI 29.56 kg/m2    Examination:  Constitutional:  Alert, well nourished, NAD.  Memory: recent and remote memory intact   HEENT: Normocephalic, atraumatic.   Pulm:  Without shortness of breath   CV:  No pitting edema of BLE.    Neurological:  Awake  Alert  Oriented x 3  Speech clear  Cranial nerves II - XII intact  PERRL  EOMI  Face symmetric  Tongue midline  Motor exam   Shoulder Abduction:  Right:  5/5   Left:  5/5  Biceps:                      Right:  5/5   Left:  5/5  Triceps:                     Right:  5/5   Left:  5/5  Wrist Extensors:       Right:  5/5   Left:  5/5  Wrist Flexors:           Right:  5/5   Left:  5/5  Intrinsics:                   Right:  5/5   Left:  5/5   Hip Flexor:                Right: 5/5  Left:  5/5  Hip Adductor:             Right:  5/5  Left:  5/5  Hip Abductor:             Right:  5/5  Left:  5/5  Gastroc Soleus:        Right:  5/5  Left:  " 5/5  Tib/Ant:                      Right:  5/5  Left:  5/5  EHL:                          Right:  5/5  Left:  5/5   Sensation normal to bilateral upper and lower extremities  Muscle tone to bilateral upper and lower extremities normal   Gait: Able to stand from a seated position. Normal non-antalgic, non-myelopathic gait.  Able to heel/toe walk without loss of balance  Cervical examination reveals good range of motion.  No tenderness to palpation of the cervical spine or paraspinous muscles bilaterally.        Imaging:     IMPRESSION:    1. C6-C7 solid anterior spinal fusion.  2. Mild to moderate multilevel degenerative disc disease and  degenerative facet arthropathy as described above.       Assessment/Plan:   Jeovany Wilburn is a 48 year old male with cervical radicular pain.  He states that he had a cervical fusion about 15 years ago which really helped his pain. Since then he has had neck stiffness but nothing exercise couldn't help. He notes that about 4-5 weeks ago he woke up stiff and then started to not numbness and pain down his arms.  He states that lifting and ROM make the pain worse.  He did go to a chiropractor which he feels helped. He has not had PT or injections for his pain.  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  We reviewed his cervical MRI and he has noted solid fusion.  He also has noted DDD. At this time it was recommended he undergo an EMG of his arms and start a medrol dose pack after the EMG.  If the pain persists then we would recommend an injection. He is open to this.  If pt would like to proceed with PT he can call for an order.        Patient Instructions   1.  Medrol dose pack for pain and inflammation.  Take as directed  AFTER you have your EMG test completed.    2.  Please schedule your EMG. This is a nerve conduction study.  Someone will contact you to schedule. I will call you with the results.         3.  Please contact the clinic if pain persists after taking  oral steroids at [phone].  Then I will order an injection.              Denae Jones [facility]  [facility]  [address]  [address] [place] [id number]    Tel [phone]  Pager [phone]

## 2018-11-16 ENCOUNTER — TRANSFERRED RECORDS (OUTPATIENT)
Dept: HEALTH INFORMATION MANAGEMENT | Facility: CLINIC | Age: 48
End: 2018-11-16

## 2018-11-20 ENCOUNTER — PATIENT OUTREACH (OUTPATIENT)
Dept: CARE COORDINATION | Facility: CLINIC | Age: 48
End: 2018-11-20

## 2018-11-27 DIAGNOSIS — M54.12 CERVICAL RADICULAR PAIN: Primary | ICD-10-CM

## 2019-03-20 NOTE — PROGRESS NOTES
SUBJECTIVE:   Jeovany Wilburn is a 48 year old male who presents to clinic today for the following health issues:      ENT Symptoms             Symptoms: cc Present Absent Comment   Fever/Chills   x Did have fever and chills at beginning of illness   Fatigue  x     Muscle Aches   x    Eye Irritation  x     Sneezing  x     Nasal Lester/Drg  x     Sinus Pressure/Pain  x  Sinus headache and dizziness when he bends over.    Loss of smell  x     Dental pain   x    Sore Throat  x     Swollen Glands   x    Ear Pain/Fullness  x  Ears feel plugged   Cough x x  Dry cough. Cough improves when he lays down.   Wheeze  x     Chest Pain   x    Shortness of breath  x     Rash   x    Other   x      Symptom duration:  1 week   Symptom severity:  moderate and improving   Treatments tried:  cough syrup, Flonase   Contacts:  possible exposure to illness on airplane home from [place]     Here today, has not been feeling well for the last week. Thinks may have had a fever at the beginning for a couple of days. Then the cough really started. Today feels like the cough maybe a bit better, but otherwise seems like has been getting gradually worse. Dry cough. Ears feel plugged, no problems in airplane. Both ears feel about the same. No N/V/D. Sore throat that is not getting better.     Problem list and histories reviewed & adjusted, as indicated.  Additional history: as documented    Current Outpatient Medications   Medication Sig Dispense Refill     doxycycline hyclate (VIBRA-TABS) 100 MG tablet Take 1 tablet (100 mg) by mouth 2 times daily 20 tablet 0     fluticasone (FLONASE) 50 MCG/ACT nasal spray Spray 1-2 sprays into both nostrils daily 1 Package 11     IBUPROFEN PO Take 600 mg by mouth       No Known Allergies    Reviewed and updated as needed this visit by clinical staff  Tobacco  Allergies  Meds  Med Hx  Surg Hx  Fam Hx  Soc Hx      Reviewed and updated as needed this visit by Provider         ROS:  Review of Systems  "  Constitutional: Positive for fever. Negative for diaphoresis and fatigue.   HENT: Positive for congestion, ear pain, sinus pain, sneezing and sore throat. Negative for rhinorrhea and sinus pressure.    Eyes: Positive for itching. Negative for discharge.   Respiratory: Positive for cough (dry), shortness of breath and wheezing.    Cardiovascular: Negative for chest pain.   Gastrointestinal: Negative for diarrhea, nausea and vomiting.   Neurological: Negative for headaches.         OBJECTIVE:     /70 (BP Location: Left arm, Patient Position: Sitting, Cuff Size: Adult Regular)   Pulse 78   Temp 97.6  F (36.4  C) (Tympanic)   Resp 12   Ht 1.803 m (5' 11\")   Wt 99.4 kg (219 lb 3.2 oz)   SpO2 100%   BMI 30.57 kg/m    Body mass index is 30.57 kg/m .  Physical Exam   Constitutional: He appears well-developed and well-nourished.   HENT:   Head: Normocephalic and atraumatic.   Right Ear: Tympanic membrane and external ear normal. Tympanic membrane is not erythematous. No middle ear effusion.   Left Ear: Tympanic membrane and external ear normal. Tympanic membrane is not erythematous.  No middle ear effusion.   Nose: No mucosal edema or rhinorrhea.   Cardiovascular: Normal rate, regular rhythm and normal heart sounds.   Pulmonary/Chest: Effort normal and breath sounds normal. He has no wheezes.   Abdominal: Soft. Bowel sounds are normal.   Neurological: He is alert.   Skin: Skin is warm and dry.   Psychiatric: He has a normal mood and affect.       ASSESSMENT/PLAN:   1. Throat pain  - Rapid strep screen  - Beta strep group A culture    2. Upper respiratory tract infection, unspecified type  Reviewed treatment plan.   Reviewed fever and pain control with tylenol and/or ibuprofen  Instructed to call or return for:  --Symptoms not improved in the next 3 days  --Worsening symptom  --New unexplained symptoms develop   - doxycycline hyclate (VIBRA-TABS) 100 MG tablet; Take 1 tablet (100 mg) by mouth 2 times daily  " Dispense: 20 tablet; Refill: 0        ESTEPHANIA [facility]

## 2019-03-21 ENCOUNTER — OFFICE VISIT (OUTPATIENT)
Dept: FAMILY MEDICINE | Facility: CLINIC | Age: 49
End: 2019-03-21
Payer: COMMERCIAL

## 2019-03-21 VITALS
HEIGHT: 71 IN | BODY MASS INDEX: 30.69 KG/M2 | HEART RATE: 78 BPM | WEIGHT: 219.2 LBS | RESPIRATION RATE: 12 BRPM | TEMPERATURE: 97.6 F | SYSTOLIC BLOOD PRESSURE: 110 MMHG | DIASTOLIC BLOOD PRESSURE: 70 MMHG | OXYGEN SATURATION: 100 %

## 2019-03-21 DIAGNOSIS — R07.0 THROAT PAIN: Primary | ICD-10-CM

## 2019-03-21 DIAGNOSIS — J06.9 UPPER RESPIRATORY TRACT INFECTION, UNSPECIFIED TYPE: ICD-10-CM

## 2019-03-21 LAB
DEPRECATED S PYO AG THROAT QL EIA: NORMAL
SPECIMEN SOURCE: NORMAL

## 2019-03-21 PROCEDURE — 87880 STREP A ASSAY W/OPTIC: CPT | Performed by: NURSE PRACTITIONER

## 2019-03-21 PROCEDURE — 87081 CULTURE SCREEN ONLY: CPT | Performed by: NURSE PRACTITIONER

## 2019-03-21 PROCEDURE — 99213 OFFICE O/P EST LOW 20 MIN: CPT | Performed by: NURSE PRACTITIONER

## 2019-03-21 RX ORDER — DOXYCYCLINE HYCLATE 100 MG
100 TABLET ORAL 2 TIMES DAILY
Qty: 20 TABLET | Refills: 0 | Status: SHIPPED | OUTPATIENT
Start: 2019-03-21

## 2019-03-21 ASSESSMENT — ENCOUNTER SYMPTOMS
DIARRHEA: 0
FEVER: 1
DIAPHORESIS: 0
EYE DISCHARGE: 0
SORE THROAT: 1
HEADACHES: 0
COUGH: 1
RHINORRHEA: 0
EYE ITCHING: 1
SHORTNESS OF BREATH: 1
NAUSEA: 0
SINUS PAIN: 1
WHEEZING: 1
VOMITING: 0
FATIGUE: 0
SINUS PRESSURE: 0

## 2019-03-21 ASSESSMENT — MIFFLIN-ST. JEOR: SCORE: 1876.41

## 2019-03-21 ASSESSMENT — PAIN SCALES - GENERAL: PAINLEVEL: NO PAIN (0)

## 2019-03-22 LAB
BACTERIA SPEC CULT: NORMAL
SPECIMEN SOURCE: NORMAL

## 2019-07-09 ENCOUNTER — TELEPHONE (OUTPATIENT)
Dept: FAMILY MEDICINE | Facility: CLINIC | Age: 49
End: 2019-07-09

## 2019-07-09 NOTE — TELEPHONE ENCOUNTER
Panel Management Review      Patient has the following on his problem list: None      Composite cancer screening  Chart review shows that this patient is due/due soon for the following Colonoscopy or Fecal Colorectal (FIT)  Summary:    Patient is due/failing the following:     Health Maintenance Due   Topic Date Due     COLONOSCOPY  08/08/1978     HIV SCREENING  08/08/1983     PREVENTIVE CARE VISIT  04/13/2013     PHQ-2  01/01/2019     ZOSTER IMMUNIZATION (1 of 2) 08/08/2018         Action needed:   Patient needs office visit for physical and discuss health maintenance.    Type of outreach:      7/9/19  Phone, left message for patient to call back.      7/16/19  Left message for patient to return call to clinic.    7/23/19  Letter Sent.     Questions for provider review:    None                                                                                                                                    Celena Rodriguez CMA       Chart routed to none .

## 2019-07-09 NOTE — LETTER
July 23, 2019      Jeovany Wilburn  [address]  [place] [address]      Dear Jeovany Wilburn    We care about your health and have reviewed your health plan including your medical conditions, medication list, and lab results.  Based on this review, it is recommended that you follow up regarding the following health topic(s):     -Colon Cancer Screening  -Wellness (Physical) Visit     We recommend you take the following action(s):  -schedule a WELLNESS (Physical) APPOINTMENT.  We will perform the following labs: (determine at appointment).    Please call us at [phone] (or use [employer]) to address the above recommendations.     Thank you for trusting [facility] and we appreciate the opportunity to serve you.  We look forward to supporting your healthcare needs in the future.    Healthy Regards,      Your Health Care Team  [facility]

## 2019-10-01 ENCOUNTER — HEALTH MAINTENANCE LETTER (OUTPATIENT)
Age: 49
End: 2019-10-01

## 2019-11-05 ENCOUNTER — TELEPHONE (OUTPATIENT)
Dept: FAMILY MEDICINE | Facility: CLINIC | Age: 49
End: 2019-11-05

## 2019-11-05 NOTE — LETTER
November 19, 2019      Jeovany Wilburn  [address] [facility] [address]            Dear Jeovany,    We care about your health and have reviewed your health plan including your medical conditions, medication list, and lab results.  Based on this review, it is recommended that you follow up regarding the following health topic(s):     -Colon Cancer Screening   -Wellness (Physical) Visit     We recommend you take the following action(s):   -schedule a WELLNESS (Physical) APPOINTMENT.     Please call us at [phone] (or use [employer]) to address the above recommendations.     Thank you for trusting [facility] and we appreciate the opportunity to serve you.  We look forward to supporting your healthcare needs in the future.     Healthy Regards,       Your Health Care Team   [facility]

## 2019-11-05 NOTE — TELEPHONE ENCOUNTER
Panel Management Review      Patient has the following on his problem list: None      Composite cancer screening  Chart review shows that this patient is due/due soon for the following Colonoscopy/Fecal Colorectal (FIT)  Summary:    Patient is due/failing the following:     Health Maintenance Due   Topic Date Due     COLONOSCOPY  08/08/1978     HIV SCREENING  08/08/1983     PREVENTIVE CARE VISIT  04/13/2013     ZOSTER IMMUNIZATION (1 of 2) 08/08/2018     PHQ-2  01/01/2019     INFLUENZA VACCINE (1) 09/01/2019         Action needed:   Patient needs office visit for physical and discuss rest of health maintenance.    Type of outreach:    11/5/19  Phone, left message for patient to call back.  and Sent [employer] message.     11/12/19  Left message for patient to return call or review [employer].    11/19/19  Letter sent.    Questions for provider review:    None                                                                                                                                    April BELKIS Rodriguez       Chart routed to none.

## 2021-01-15 ENCOUNTER — HEALTH MAINTENANCE LETTER (OUTPATIENT)
Age: 51
End: 2021-01-15

## 2021-04-09 ENCOUNTER — IMMUNIZATION (OUTPATIENT)
Dept: NURSING | Facility: CLINIC | Age: 51
End: 2021-04-09
Payer: COMMERCIAL

## 2021-04-09 PROCEDURE — 0001A PR COVID VAC PFIZER DIL RECON 30 MCG/0.3 ML IM: CPT

## 2021-04-09 PROCEDURE — 91300 PR COVID VAC PFIZER DIL RECON 30 MCG/0.3 ML IM: CPT

## 2021-04-30 ENCOUNTER — IMMUNIZATION (OUTPATIENT)
Dept: NURSING | Facility: CLINIC | Age: 51
End: 2021-04-30
Attending: INTERNAL MEDICINE
Payer: COMMERCIAL

## 2021-04-30 PROCEDURE — 91300 PR COVID VAC PFIZER DIL RECON 30 MCG/0.3 ML IM: CPT

## 2021-04-30 PROCEDURE — 0002A PR COVID VAC PFIZER DIL RECON 30 MCG/0.3 ML IM: CPT

## 2021-09-04 ENCOUNTER — HEALTH MAINTENANCE LETTER (OUTPATIENT)
Age: 51
End: 2021-09-04

## 2021-10-24 ENCOUNTER — HOSPITAL ENCOUNTER (EMERGENCY)
Facility: CLINIC | Age: 51
Discharge: HOME | End: 2021-10-24
Attending: STUDENT IN AN ORGANIZED HEALTH CARE EDUCATION/TRAINING PROGRAM | Admitting: STUDENT IN AN ORGANIZED HEALTH CARE EDUCATION/TRAINING PROGRAM
Payer: COMMERCIAL

## 2021-10-24 ENCOUNTER — APPOINTMENT (OUTPATIENT)
Dept: GENERAL RADIOLOGY | Facility: CLINIC | Age: 51
End: 2021-10-24
Attending: PHYSICIAN ASSISTANT
Payer: COMMERCIAL

## 2021-10-24 VITALS
DIASTOLIC BLOOD PRESSURE: 90 MMHG | BODY MASS INDEX: 30.82 KG/M2 | TEMPERATURE: 97.1 F | OXYGEN SATURATION: 98 % | RESPIRATION RATE: 18 BRPM | WEIGHT: 221 LBS | HEART RATE: 67 BPM | SYSTOLIC BLOOD PRESSURE: 151 MMHG

## 2021-10-24 DIAGNOSIS — S22.31XA CLOSED FRACTURE OF ONE RIB OF RIGHT SIDE, INITIAL ENCOUNTER: ICD-10-CM

## 2021-10-24 DIAGNOSIS — M54.6 ACUTE RIGHT-SIDED THORACIC BACK PAIN: ICD-10-CM

## 2021-10-24 DIAGNOSIS — R07.81 RIB PAIN ON RIGHT SIDE: ICD-10-CM

## 2021-10-24 PROCEDURE — 71101 X-RAY EXAM UNILAT RIBS/CHEST: CPT | Mod: RT

## 2021-10-24 PROCEDURE — 99282 EMERGENCY DEPT VISIT SF MDM: CPT | Performed by: PHYSICIAN ASSISTANT

## 2021-10-24 PROCEDURE — 99283 EMERGENCY DEPT VISIT LOW MDM: CPT

## 2021-10-24 RX ORDER — IBUPROFEN 600 MG/1
600 TABLET, FILM COATED ORAL EVERY 6 HOURS PRN
Qty: 28 TABLET | Refills: 0 | Status: SHIPPED | OUTPATIENT
Start: 2021-10-24 | End: 2021-10-31

## 2021-10-24 RX ORDER — CYCLOBENZAPRINE HCL 10 MG
10 TABLET ORAL 3 TIMES DAILY PRN
Qty: 21 TABLET | Refills: 0 | Status: SHIPPED | OUTPATIENT
Start: 2021-10-24 | End: 2021-10-31

## 2021-10-24 ASSESSMENT — ENCOUNTER SYMPTOMS
NEUROLOGICAL NEGATIVE: 1
RESPIRATORY NEGATIVE: 1
GASTROINTESTINAL NEGATIVE: 1
BACK PAIN: 1
CARDIOVASCULAR NEGATIVE: 1

## 2021-10-24 NOTE — ED PROVIDER NOTES
History     Chief Complaint   Patient presents with     Fall     HPI  Jeovany Wilburn is a 51 year old male with a past medical history of spondylolisthesis of the lumbosacral region who presents with right-sided low back pain which began 3 days ago.  The patient was walking down a stairway [place] cabin several hours away and lost his footing, fell backwards, landing on the right side of his back.  The patient states that he landed on a rock.  He has had muscle spasms and back pain mostly localized to the affected area at rest.  When he bends forward or backward, the pain radiates down into his low back.  The pain is mostly localized over the paraspinal muscles in the low thoracic and lumbar regions on the right side.  He has been taking ibuprofen 600 mg on a routine basis with limited relief of symptoms. No concerns with chest pain or shortness of breath, no coughing, no difficulties with breathing or swallowing.  He reports having some occasional numbness and tingling in his right arm and right leg over the past few days, has been intermittent, now resolved.  However the patient experiences pain in the affected area with deep breathing and increased abdominal pressure such as when having a stool.  He reports having some numbness and tingling in his right arm and right leg no head or neck trauma.  The patient denies any other trauma.  The patient is not on blood thinners. No concerns with bowel or bladder function, no saddle anesthesia, no urinary or bowel retention or incontinence.  No concerns with gait or neurologic function.  Has a previous history of cervical spinal fusion between C5 and C6 in 2004, had lumbar spinal fusion in 2014.    Allergies:  No Known Allergies    Problem List:    Patient Active Problem List    Diagnosis Date Noted     Spondylolisthesis of lumbosacral region 12/18/2014     Priority: Medium     Gout 04/19/2012     Priority: Medium     April 19, 2012 Elevated uric acid at 9.6, we'll start  allopurinol therapy.        Atopic rhinitis 12/27/2011     Priority: Medium     (Problem list name updated by automated process. Provider to review and confirm.)       ETD (eustachian tube dysfunction) 12/27/2011     Priority: Medium        Past Medical History:    No past medical history on file.    Past Surgical History:    Past Surgical History:   Procedure Laterality Date     CERVICAL FUSION       LAMINECT/DISCECTOMY, LUMBAR  1/1/2004    Laminectomy/Discectomy Cervical - fusion     LUMBAR FUSION Bilateral 12/18/2014    Procedure: BILATERAL ANTERIOR AND POSTERIOR LUMBAR FUSION L5-S1 WITH LEFT ILIAC CREST BONE GRAFT ;  Surgeon: Geo De La Cruz MD;  Location: [facility];  Service:        Family History:    Family History   Problem Relation Age of Onset     Family History Negative Mother      Family History Negative Father        Social History:  Marital Status:   [2]  Social History     Tobacco Use     Smoking status: Never Smoker     Smokeless tobacco: Never Used   Substance Use Topics     Alcohol use: Yes     Comment: 3-4 drinks per week     Drug use: No        Medications:    cyclobenzaprine (FLEXERIL) 10 MG tablet  ibuprofen (ADVIL/MOTRIN) 600 MG tablet  doxycycline hyclate (VIBRA-TABS) 100 MG tablet  fluticasone (FLONASE) 50 MCG/ACT nasal spray          Review of Systems   Respiratory: Negative.    Cardiovascular: Negative.    Gastrointestinal: Negative.    Genitourinary: Negative.    Musculoskeletal: Positive for back pain.   Skin: Negative.    Neurological: Negative.        Physical Exam   BP: (!) 151/90  Pulse: 67  Temp: 97.1  F (36.2  C)  Resp: 18  Weight: 100.2 kg (221 lb)  SpO2: 98 %      Physical Exam  Constitutional:       General: He is not in acute distress.     Appearance: Normal appearance. He is not ill-appearing, toxic-appearing or diaphoretic.   HENT:      Head: Normocephalic and atraumatic.   Musculoskeletal:         General: No swelling.      Cervical back: Normal,  normal range of motion and neck supple. No rigidity. No muscular tenderness.      Thoracic back: Spasms and tenderness present. No swelling, edema, deformity, signs of trauma, lacerations or bony tenderness. Normal range of motion. No scoliosis.      Lumbar back: Normal.        Back:       Comments: Has moderate tightness/spasming of the paraspinal muscles at the level of T11-T12 on the right side.  Has increased pain with flexion and extension of the spine, as well as with lateral bending to the right.   Skin:     General: Skin is warm and dry.      Findings: No laceration, lesion or rash.   Neurological:      General: No focal deficit present.      Mental Status: He is alert and oriented to person, place, and time.      Sensory: No sensory deficit.      Motor: No weakness.      Coordination: Coordination normal.      Gait: Gait normal.      Deep Tendon Reflexes: Reflexes normal.   Psychiatric:         Mood and Affect: Mood normal.         Behavior: Behavior normal.         Thought Content: Thought content normal.         Judgment: Judgment normal.         ED Course        Procedures              Results for orders placed or performed during the hospital encounter of 10/24/21 (from the past 24 hour(s))   Ribs XR, unilat 3 views + PA chest, right    Narrative    EXAM: XR RIBS and CHEST RT 3VW  LOCATION: [facility]  DATE/TIME: 10/24/2021 3:21 PM    INDICATION: right posterior rib pain secondary to a fall, ribs 10 to 12, pain secondary to a fall and point loading  COMPARISON: None.      Impression    IMPRESSION: The visualized heart and lungs are negative. Postop changes of a cervical spinal fusion. Subtle linear lucency in the posterior portion of the right 11th rib likely an acute nondisplaced fracture.       Medications - No data to display    Assessments & Plan (with Medical Decision Making)   The patient is a 51 year old male with a past medical history of spondylolisthesis of the  lumbosacral region who presents with right-sided low back pain which began 3 days ago.  The patient was walking down a stairway [place] his cabin several hours away and lost his footing, fell backwards, landing on the right side of his back.  The patient states that he landed on a rock.    X-rays of the right ribs showed a subtle linear lucency in the posterior portion of the right 11th rib, likely an acute nondisplaced fracture.    Discussed rib fractures with the patient, stated that it would take 6 to 8 weeks for full recovery.  Recommended not lifting any more than 15 to 20 pounds in the right side, especially over the next 2 to 3 weeks.  Recommended follow-up in clinic if pain persists.  Offered to provide the patient with [place] but the patient declined.  Wants to continue treatment with ibuprofen and muscle relaxants.  Provided the patient with an incentive spirometer as well.    Recommend urgent medical evaluation if the patient develops acute abdominal pain, worsening pain in the back, numbness or tingling in the lower extremities, chest pain or shortness of breath, difficulties with breathing or swallowing, or saddle anesthesia, urinary or bowel incontinence or retention.      I have reviewed the nursing notes.    I have reviewed the findings, diagnosis, plan and need for follow up with the patient.      New Prescriptions    CYCLOBENZAPRINE (FLEXERIL) 10 MG TABLET    Take 1 tablet (10 mg) by mouth 3 times daily as needed for muscle spasms    IBUPROFEN (ADVIL/MOTRIN) 600 MG TABLET    Take 1 tablet (600 mg) by mouth every 6 hours as needed for moderate pain       Final diagnoses:   Acute right-sided thoracic back pain   Rib pain on right side   Closed fracture of one rib of right side, initial encounter       10/24/2021   [facility] EMERGENCY DEPT     Wayne Trinh PA-C  10/24/21 [id number]

## 2021-10-24 NOTE — ED TRIAGE NOTES
Right sided rib and back pain since Friday.  Patient was going down stairs and slipped on a rock landing on right side.  No loss of consciousness.  Patient did not hit head.  Patient not on blood thinners.

## 2021-10-24 NOTE — DISCHARGE INSTRUCTIONS
Recommend urgent medical evaluation if you develop worsening pain, worsening numbness or tingling or loss of feeling in the back or lower extremities, or saddle anesthesia (numbness or tingling in the groin area) with urinary or bowel incontinence or retention.  May use Voltaren gel 4 times per day or a lidocaine patches (apply to the low back for 12 hours per day) for additional pain control. Recommend not using heat at the same time as lidocaine patches or Voltaren gel to avoid excessive irritation.        Using an Incentive Spirometer    An incentive spirometer is a device that helps you do deep breathing exercises after surgery. Or it helps lower the risk for breathing problems if you have a lung disease or condition. These exercises expand your lungs, aid in circulation, and help prevent pneumonia. Deep breathing exercises also help you breathe better and improve the function of your lungs by:  Keeping your lungs clear  Strengthening your breathing muscles  Helping prevent respiratory complications or problems  The incentive spirometer gives you a way to take an active part in your recovery. A nurse or respiratory therapist will teach you breathing exercises. To do these exercises, you will breathe in through your mouth and not your nose. The incentive spirometer only works correctly if you breathe in through your mouth.  Your healthcare provider or his/her staff will tell you how to use the device, your targeted volume(s), and provide other helpful tips to prevent complications (such as pain, dizziness, feeling lightheaded) when blowing in the incentive spirometer.  Steps to clear lungs  Step 1. Exhale normally. Then, inhale normally.  Relax and breathe out.  Step 2. Place your lips tightly around the mouthpiece.  Make sure the device is upright and not tilted.  Sit up and breathe out (exhale) fully  Tightly seal your lips around the mouthpiece  Step 3. Inhale as much air as you can through the mouthpiece.  Don't breathe through your nose.  Breathe in (inhale) slowly and deeply.  Hold your breath long enough to keep the balls, piston, or disk raised for at least 3 to 5 seconds, or as instructed by your healthcare provider.  Exhale slowly to allow the balls, piston, or disk to fall before repeating again.  Note: Some spirometers have an indicator to let you know that you are breathing in too fast. If the indicator goes off, breathe in more slowly.  Step 4. Repeat the exercise regularly.  Do sets of 10 exercises every hour while you're awake, or as instructed by your healthcare provider. Don't do more than 30 breaths in each set.  If you were taught deep breathing and coughing exercises, do them regularly as instructed by your healthcare provider, nurse, or respiratory therapist.     Follow-up care  Make a follow up appointment, or as directed by your healthcare provider. Also, follow up with your healthcare provider as advised or if your symptoms don't improve or continue to get worse.  When to call your healthcare provider  Call your healthcare provider right away if you have any of the following:  Fever 100.4  (38 C) or higher, or as directed by your healthcare provider  Brownish, bloody, or smelly sputum (phlegm that you cough up)  Call 911  Call 911 if any of these occur:   Shortness of breath that doesn't get better after taking your medicine  Cool, moist, pale or blue skin  Trouble breathing or swallowing, wheezing  Fainting or loss of consciousness  Feeling of dizziness or weakness, or a sudden drop in blood pressure  Feeling very ill  Lightheadedness  Chest pain or rapid heart rate  [employer] last reviewed this educational content on 6/1/2019 2000-2021 [employer]. All rights reserved. This information is not intended as a substitute for professional medical care. Always follow your healthcare professional's instructions.            Rib Fracture    You broke one or more ribs. This is called a rib  fracture. Rib fractures don't need a cast like other bones. They will heal by themselves in about 4 to 6 weeks. The first 3 to 4 weeks will be the most painful. During this time deep breathing, coughing, or changing position from sitting to lying down, may cause the broken ends to move slightly.   Home care  Rest. You should not be doing any heavy lifting or strenuous exertion until the pain goes away.  It hurts to breathe when you have a broken rib. This puts you at risk of getting pneumonia from poor airflow through your lungs. To prevent this:  Take several very deep breaths once an hour while you're awake. Breathe out through pursed lips as if you are blowing up a balloon. If possible, actually blow up a balloon or a rubber glove. This exercise builds up pressure inside the lung and prevents collapse of the small air sacs of the lung. This exercise may cause some pain at the site of injury. This is normal.  You may have gotten a breathing exercise device called an incentive spirometer. Use it at least 4 times a day, or as directed.  Apply an ice pack over the injured area for 15 to 20 minutes every 1 to 2 hours. You should do this for the first 24 to 48 hours. To make an ice pack, put ice cubes in a plastic bag that seals at the top. Wrap the bag in a clean, thin towel or cloth. Never put ice or an ice pack directly on the skin. Keep using ice packs as needed for the relief of pain and swelling.  You may use over-the-counter pain medicine to control pain, unless another pain medicine was prescribed. If you have chronic liver or kidney disease or ever had a stomach ulcer or GI (gastrointestinal) bleeding, talk with your healthcare provider before using these medicines.  If your pain is not controlled, contact your healthcare provider. Sometimes a stronger pain medicine may be needed. A nerve block can be done in case of severe pain. It will numb the nerve between the ribs.    Follow-up care  Follow up with your  healthcare provider, or as advised. In rare cases, a broken rib will cause complications in the first few days that may not be clearly seen during your initial exam. This can include collapsed lung, bleeding around the lung or into the belly (abdomen), or pneumonia. So watch for the signs below.   If X-rays were taken, you will be told of any new findings that may affect your care.  Call 911  Call 911 if you have:   Dizziness, weakness or fainting  Shortness of breath with or without chest discomfort  New or worsening abdominal pain  Discomfort in other areas of your upper body such as your shoulders, jaw, neck, or arms  When to seek medical advice  Call your healthcare provider right away if any of these occur:  Increasing chest pain with breathing  Fever of 100.4 F (38 C) or above, or as directed by your healthcare provider  Congested cough, nausea, or vomiting  [employer] last reviewed this educational content on 4/1/2018 2000-2021 [employer]. All rights reserved. This information is not intended as a substitute for professional medical care. Always follow your healthcare professional's instructions.

## 2021-12-22 ENCOUNTER — IMMUNIZATION (OUTPATIENT)
Dept: NURSING | Facility: CLINIC | Age: 51
End: 2021-12-22
Payer: COMMERCIAL

## 2021-12-22 PROCEDURE — 91300 PR COVID VAC PFIZER DIL RECON 30 MCG/0.3 ML IM: CPT

## 2021-12-22 PROCEDURE — 0004A PR COVID VAC PFIZER DIL RECON 30 MCG/0.3 ML IM: CPT

## 2022-02-19 ENCOUNTER — HEALTH MAINTENANCE LETTER (OUTPATIENT)
Age: 52
End: 2022-02-19

## 2022-05-03 ENCOUNTER — APPOINTMENT (OUTPATIENT)
Dept: LAB | Age: 52
End: 2022-05-03

## 2022-10-16 ENCOUNTER — HEALTH MAINTENANCE LETTER (OUTPATIENT)
Age: 52
End: 2022-10-16

## 2023-03-26 ENCOUNTER — HEALTH MAINTENANCE LETTER (OUTPATIENT)
Age: 53
End: 2023-03-26

## 2024-06-01 ENCOUNTER — HEALTH MAINTENANCE LETTER (OUTPATIENT)
Age: 54
End: 2024-06-01